# Patient Record
Sex: FEMALE | Race: WHITE | NOT HISPANIC OR LATINO | Employment: FULL TIME | URBAN - NONMETROPOLITAN AREA
[De-identification: names, ages, dates, MRNs, and addresses within clinical notes are randomized per-mention and may not be internally consistent; named-entity substitution may affect disease eponyms.]

---

## 2023-06-08 ENCOUNTER — TELEMEDICINE (OUTPATIENT)
Dept: PRIMARY CARE | Facility: CLINIC | Age: 43
End: 2023-06-08
Payer: COMMERCIAL

## 2023-06-08 DIAGNOSIS — F41.1 GENERALIZED ANXIETY DISORDER: ICD-10-CM

## 2023-06-08 DIAGNOSIS — Z13.0 SCREENING FOR DEFICIENCY ANEMIA: Primary | ICD-10-CM

## 2023-06-08 DIAGNOSIS — G89.29 CHRONIC LEFT HIP PAIN: ICD-10-CM

## 2023-06-08 DIAGNOSIS — Z13.220 SCREENING FOR CHOLESTEROL LEVEL: ICD-10-CM

## 2023-06-08 DIAGNOSIS — M25.552 CHRONIC LEFT HIP PAIN: ICD-10-CM

## 2023-06-08 DIAGNOSIS — R11.0 NAUSEA: ICD-10-CM

## 2023-06-08 PROBLEM — R12 HEARTBURN: Status: ACTIVE | Noted: 2023-06-08

## 2023-06-08 PROBLEM — S76.012A STRAIN OF LEFT HIP: Status: ACTIVE | Noted: 2023-06-08

## 2023-06-08 PROCEDURE — 99214 OFFICE O/P EST MOD 30 MIN: CPT | Performed by: NURSE PRACTITIONER

## 2023-06-08 RX ORDER — BUSPIRONE HYDROCHLORIDE 5 MG/1
5 TABLET ORAL 2 TIMES DAILY
Qty: 60 TABLET | Refills: 11 | Status: SHIPPED | OUTPATIENT
Start: 2023-06-08 | End: 2024-06-07

## 2023-06-08 RX ORDER — MULTIVITAMIN
1 TABLET ORAL DAILY
COMMUNITY

## 2023-06-08 RX ORDER — ASCORBIC ACID 500 MG
1 TABLET ORAL DAILY
COMMUNITY

## 2023-06-08 RX ORDER — HYDROXYZINE HYDROCHLORIDE 10 MG/1
10 TABLET, FILM COATED ORAL EVERY 4 HOURS PRN
COMMUNITY

## 2023-06-08 RX ORDER — CYCLOBENZAPRINE HCL 5 MG
5 TABLET ORAL 3 TIMES DAILY PRN
Qty: 30 TABLET | Refills: 0 | Status: SHIPPED | OUTPATIENT
Start: 2023-06-08

## 2023-06-08 RX ORDER — ONDANSETRON 4 MG/1
4 TABLET, ORALLY DISINTEGRATING ORAL EVERY 8 HOURS PRN
Qty: 20 TABLET | Refills: 0 | Status: SHIPPED | OUTPATIENT
Start: 2023-06-08 | End: 2023-06-15

## 2023-06-08 RX ORDER — CYCLOBENZAPRINE HCL 5 MG
1 TABLET ORAL 3 TIMES DAILY PRN
COMMUNITY
Start: 2022-05-19 | End: 2023-06-08 | Stop reason: SDUPTHER

## 2023-06-08 RX ORDER — GLUC/MSM/COLGN2/HYAL/ANTIARTH3 375-375-20
TABLET ORAL
COMMUNITY

## 2023-06-08 NOTE — PROGRESS NOTES
Subjective   Dary Webster is a 43 y.o. female and is here for a comprehensive physical exam. The patient reports  left hip pain off/on has been doing exercises .  Complains of nausea, likely related to poor appetite due to increased stress/anxiety regarding job  Is taking hydroxyzine daily at bedtime, unable to take during day due to drowsiness.     Do you take any herbs or supplements that were not prescribed by a doctor?  MVI, B12. Iron  Are you taking calcium supplements? no  Are you taking aspirin daily? no     Increase job stress, works in home care      History:  LMP: No LMP recorded.  Menopause at NA years  Last pap date:   Abnormal pap? no  : 2  Para: 2    Do you have pain that bothers you in your daily life? no  Review of Systems   Constitutional:  Positive for appetite change (decreased appetite related to stress/anxiety).   HENT: Negative.     Respiratory:  Negative for cough, shortness of breath and wheezing.    Cardiovascular:  Negative for chest pain, palpitations and leg swelling.   Gastrointestinal:  Positive for nausea. Negative for abdominal pain, diarrhea and vomiting.   Genitourinary: Negative.    Musculoskeletal:  Positive for arthralgias (left hip/low back) and back pain.   Skin: Negative.    Neurological:  Negative for dizziness, weakness, light-headedness and headaches.   Hematological:  Does not bruise/bleed easily.   Psychiatric/Behavioral:  The patient is nervous/anxious.         Reports increased work related stress        Objective   Physical Exam  Constitutional:       General: She is not in acute distress.     Appearance: Normal appearance.      Comments: Unable to perform complete physical exam due to virtual visit, patient was visualized on interactive video.    Pulmonary:      Effort: Pulmonary effort is normal.   Neurological:      Mental Status: She is alert and oriented to person, place, and time.         Assessment/Plan   Healthy female exam.      1. OZIEL: Will  continue on Hydroxyzine as needed and start on Buspar 5 mg twice daily. Will follow up in 3 months  2. Nausea: Prescribed Zofran as needed, may take OTC ginger tablets as needed as well  3. Patient Counseling:  --Nutrition: Stressed importance of moderation in sodium/caffeine intake, saturated fat and cholesterol, caloric balance, sufficient intake of fresh fruits, vegetables, fiber, calcium, iron, and 1 mg of folate supplement per day (for females capable of pregnancy).  --Discussed the issue of estrogen replacement, calcium supplement, and the daily use of baby aspirin.  --Exercise: Stressed the importance of regular exercise.   --Injury prevention: Discussed safety belts, safety helmets, smoke detector, smoking near bedding or upholstery.   --Dental health: Discussed importance of regular tooth brushing, flossing, and dental visits.  --Immunizations reviewed.  --Discussed benefits of screening colonoscopy.  --After hours service discussed with patient  3. Discussed the patient's BMI with her.  The BMI is in the acceptable range.  4. Follow up  wellness in 1 year, follow up on anxiety/nausea in 3 months

## 2023-06-09 ASSESSMENT — ENCOUNTER SYMPTOMS
DIZZINESS: 0
APPETITE CHANGE: 1
COUGH: 0
NERVOUS/ANXIOUS: 1
ARTHRALGIAS: 1
DIARRHEA: 0
NAUSEA: 1
ABDOMINAL PAIN: 0
BRUISES/BLEEDS EASILY: 0
SHORTNESS OF BREATH: 0
BACK PAIN: 1
WHEEZING: 0
LIGHT-HEADEDNESS: 0
WEAKNESS: 0
VOMITING: 0
PALPITATIONS: 0
HEADACHES: 0

## 2023-11-08 ENCOUNTER — LAB (OUTPATIENT)
Dept: LAB | Facility: LAB | Age: 43
End: 2023-11-08
Payer: COMMERCIAL

## 2023-11-08 DIAGNOSIS — F41.1 GENERALIZED ANXIETY DISORDER: ICD-10-CM

## 2023-11-08 DIAGNOSIS — Z13.220 SCREENING FOR CHOLESTEROL LEVEL: ICD-10-CM

## 2023-11-08 DIAGNOSIS — Z13.0 SCREENING FOR DEFICIENCY ANEMIA: ICD-10-CM

## 2023-11-08 LAB
ALBUMIN SERPL BCP-MCNC: 4.6 G/DL (ref 3.4–5)
ALP SERPL-CCNC: 48 U/L (ref 33–110)
ALT SERPL W P-5'-P-CCNC: 18 U/L (ref 7–45)
ANION GAP SERPL CALC-SCNC: 10 MMOL/L (ref 10–20)
AST SERPL W P-5'-P-CCNC: 21 U/L (ref 9–39)
BILIRUB SERPL-MCNC: 0.4 MG/DL (ref 0–1.2)
BUN SERPL-MCNC: 17 MG/DL (ref 6–23)
CALCIUM SERPL-MCNC: 9.2 MG/DL (ref 8.6–10.3)
CHLORIDE SERPL-SCNC: 101 MMOL/L (ref 98–107)
CHOLEST SERPL-MCNC: 185 MG/DL (ref 0–199)
CHOLESTEROL/HDL RATIO: 1.9
CO2 SERPL-SCNC: 28 MMOL/L (ref 21–32)
CREAT SERPL-MCNC: 0.78 MG/DL (ref 0.5–1.05)
ERYTHROCYTE [DISTWIDTH] IN BLOOD BY AUTOMATED COUNT: 12.2 % (ref 11.5–14.5)
GFR SERPL CREATININE-BSD FRML MDRD: >90 ML/MIN/1.73M*2
GLUCOSE SERPL-MCNC: 94 MG/DL (ref 74–99)
HCT VFR BLD AUTO: 40.4 % (ref 36–46)
HDLC SERPL-MCNC: 99 MG/DL
HGB BLD-MCNC: 13 G/DL (ref 12–16)
LDLC SERPL CALC-MCNC: 77 MG/DL
MCH RBC QN AUTO: 30.8 PG (ref 26–34)
MCHC RBC AUTO-ENTMCNC: 32.2 G/DL (ref 32–36)
MCV RBC AUTO: 96 FL (ref 80–100)
NON HDL CHOLESTEROL: 86 MG/DL (ref 0–149)
NRBC BLD-RTO: 0 /100 WBCS (ref 0–0)
PLATELET # BLD AUTO: 310 X10*3/UL (ref 150–450)
POTASSIUM SERPL-SCNC: 4 MMOL/L (ref 3.5–5.3)
PROT SERPL-MCNC: 7.1 G/DL (ref 6.4–8.2)
RBC # BLD AUTO: 4.22 X10*6/UL (ref 4–5.2)
SODIUM SERPL-SCNC: 135 MMOL/L (ref 136–145)
TRIGL SERPL-MCNC: 45 MG/DL (ref 0–149)
VLDL: 9 MG/DL (ref 0–40)
WBC # BLD AUTO: 7.2 X10*3/UL (ref 4.4–11.3)

## 2023-11-08 PROCEDURE — 80061 LIPID PANEL: CPT

## 2023-11-08 PROCEDURE — 36415 COLL VENOUS BLD VENIPUNCTURE: CPT

## 2023-11-08 PROCEDURE — 80053 COMPREHEN METABOLIC PANEL: CPT

## 2023-11-08 PROCEDURE — 85027 COMPLETE CBC AUTOMATED: CPT

## 2024-02-27 ENCOUNTER — OFFICE VISIT (OUTPATIENT)
Dept: OBSTETRICS AND GYNECOLOGY | Facility: CLINIC | Age: 44
End: 2024-02-27
Payer: COMMERCIAL

## 2024-02-27 VITALS
WEIGHT: 163 LBS | HEIGHT: 65 IN | SYSTOLIC BLOOD PRESSURE: 120 MMHG | BODY MASS INDEX: 27.16 KG/M2 | DIASTOLIC BLOOD PRESSURE: 72 MMHG

## 2024-02-27 DIAGNOSIS — N92.0 MENORRHAGIA WITH REGULAR CYCLE: Primary | ICD-10-CM

## 2024-02-27 PROCEDURE — 99203 OFFICE O/P NEW LOW 30 MIN: CPT | Performed by: OBSTETRICS & GYNECOLOGY

## 2024-02-27 PROCEDURE — 1036F TOBACCO NON-USER: CPT | Performed by: OBSTETRICS & GYNECOLOGY

## 2024-02-27 RX ORDER — DROSPIRENONE AND ETHINYL ESTRADIOL 0.03MG-3MG
1 KIT ORAL DAILY
Qty: 28 TABLET | Refills: 3 | Status: ON HOLD | OUTPATIENT
Start: 2024-02-27 | End: 2024-04-25 | Stop reason: ALTCHOICE

## 2024-02-27 NOTE — PROGRESS NOTES
Dary Webster is a 43 y.o. year old female patient.  PCP = Raleigh Allen, APRN-CNP    Chief Complaint   Patient presents with    New Patient Visit     New patient here to discuss endometrial ablation. Patient states her periods have become heavier and more painful. Patient states she has to change a pad/tampon every 2-3 hours. LMP: 24.       HPI   Presents stating that her menstrual flows have progressively become heavier with more cramping over the last several years.  She has tried birth control pills and the Nexplanon in the past without good effect.  Her  had a vasectomy.  She has regular monthly cycles without intermenstrual bleeding.  She is a non-smoker.    OB History          2    Para   2    Term   2       0    AB   0    Living   2         SAB   0    IAB   0    Ectopic   0    Multiple   0    Live Births   2                 Past Medical History:   Diagnosis Date    Encounter for gynecological examination (general) (routine) without abnormal findings     Pap test, as part of routine gynecological examination    Other conditions influencing health status     Menarche    Other conditions influencing health status     History of vaginal delivery       Past Surgical History:   Procedure Laterality Date    OTHER SURGICAL HISTORY  10/26/2019    Hip surgery    OTHER SURGICAL HISTORY  10/26/2019    Corneal lasik    OTHER SURGICAL HISTORY  10/26/2019    Hernia repair    OTHER SURGICAL HISTORY  2021    Cholecystectomy laparoscopic       Review of Systems:   Constitutional: No fever or chills  Respiratory: No shortness of breath, or cough  Cardiovascular: No chest pain or syncope  Breasts: No breast pain, no masses, no nipple discharge  Gastrointestinal: No nausea, vomiting, or diarrhea, no abdominal pain  Genitourinary: No dysuria or frequency  Gynecology: Negative except as noted in history of present illness  All other: All other systems reviewed and negative for  "complaint    Medication Documentation Review Audit       Reviewed by Cholo Echeverria MD (Physician) on 02/27/24 at 0910      Medication Order Taking? Sig Documenting Provider Last Dose Status   ascorbic acid (Vitamin C) 500 mg tablet [id number]  Take 1 tablet (500 mg) by mouth once daily. Historical Provider, MD  Active   busPIRone (Buspar) 5 mg tablet [id number]  Take 1 tablet (5 mg) by mouth 2 times a day. Raleigh Allen APRN-CNP  Active   cyclobenzaprine (Flexeril) 5 mg tablet [id number]  Take 1 tablet (5 mg) by mouth 3 times a day as needed for muscle spasms (hip/low back pain). JOSE Finn-CNP  Active   ferrous gluconate 236 mg (27 mg iron) tablet [id number]  Take by mouth. Historical Provider, MD  Active   hydrOXYzine HCL (Atarax) 10 mg tablet [id number]  Take 1 tablet (10 mg) by mouth every 4 hours if needed. Historical Provider, MD  Active   multivitamin tablet [id number]  Take 1 tablet by mouth once daily. Historical Provider, MD  Active                     /72   Ht 1.651 m (5' 5\")   Wt 73.9 kg (163 lb)   LMP 02/19/2024   BMI 27.12 kg/m²     PHYSICAL EXAMINATION:  General: No acute distress  Eye: Intraocular movements are intact  HEENT: Normocephalic  Respiratory: Respirations are nonlabored  Gastrointestinal: Nondistended   Musculoskeletal: Normal range of motion  Neurologic: Alert and oriented x3  Psychiatric: Cooperative, appropriate mood and affect.         Problem List Items Addressed This Visit    None  Visit Diagnoses       Menorrhagia with regular cycle    -  Primary    Relevant Medications    drospirenone-ethinyl estradioL (Alma, Ocella) 3-0.03 mg tablet             Provider Impression:  1.  Menorrhagia  Discussed hormonal contraceptive options NuvaRing or IUD as well as endometrial ablation for her heavy menstrual flows.  Patient desires to proceed with endometrial ablation.  Recommend starting her on birth control pills in order to help thin out the lining of the uterus in anticipation of " the ablation.  Patient to return in several weeks for annual exam and will also need to schedule appointment for endometrial biopsy in the office.

## 2024-03-26 ENCOUNTER — PREP FOR PROCEDURE (OUTPATIENT)
Dept: OBSTETRICS AND GYNECOLOGY | Facility: CLINIC | Age: 44
End: 2024-03-26

## 2024-03-26 ENCOUNTER — OFFICE VISIT (OUTPATIENT)
Dept: OBSTETRICS AND GYNECOLOGY | Facility: CLINIC | Age: 44
End: 2024-03-26
Payer: COMMERCIAL

## 2024-03-26 VITALS
WEIGHT: 161.8 LBS | SYSTOLIC BLOOD PRESSURE: 128 MMHG | BODY MASS INDEX: 26.96 KG/M2 | HEIGHT: 65 IN | DIASTOLIC BLOOD PRESSURE: 68 MMHG

## 2024-03-26 DIAGNOSIS — Z01.419 ENCOUNTER FOR GYNECOLOGICAL EXAMINATION WITHOUT ABNORMAL FINDING: ICD-10-CM

## 2024-03-26 DIAGNOSIS — Z12.4 ENCOUNTER FOR SCREENING FOR CERVICAL CANCER: ICD-10-CM

## 2024-03-26 DIAGNOSIS — N92.0 MENORRHAGIA WITH REGULAR CYCLE: Primary | ICD-10-CM

## 2024-03-26 DIAGNOSIS — Z12.31 ENCOUNTER FOR SCREENING MAMMOGRAM FOR BREAST CANCER: ICD-10-CM

## 2024-03-26 PROCEDURE — 99396 PREV VISIT EST AGE 40-64: CPT | Performed by: OBSTETRICS & GYNECOLOGY

## 2024-03-26 PROCEDURE — 88175 CYTOPATH C/V AUTO FLUID REDO: CPT

## 2024-03-26 PROCEDURE — 1036F TOBACCO NON-USER: CPT | Performed by: OBSTETRICS & GYNECOLOGY

## 2024-03-26 RX ORDER — CELECOXIB 400 MG/1
400 CAPSULE ORAL ONCE
Status: CANCELLED | OUTPATIENT
Start: 2024-03-26 | End: 2024-03-26

## 2024-03-26 RX ORDER — ACETAMINOPHEN 325 MG/1
975 TABLET ORAL ONCE
Status: CANCELLED | OUTPATIENT
Start: 2024-03-26 | End: 2024-03-26

## 2024-03-26 RX ORDER — GABAPENTIN 600 MG/1
600 TABLET ORAL ONCE
Status: CANCELLED | OUTPATIENT
Start: 2024-03-26 | End: 2024-03-26

## 2024-03-26 NOTE — PROGRESS NOTES
Dary Webster is a 44 y.o. female who is here for a routine exam. PCP = Raleigh Allen, APRN-CNP    Chief Complaint   Patient presents with    Gynecologic Exam     Patient is here for yearly exam and pap test. Patient does do regular self breast exams and has no concerns at this time. LMP: 3/25/24.          Presents for annual exam. She voices no complaints and is doing well. Denies any bowel or bladder problems. Denies any breast problems.   had vasectomy.  Patient started Alma last month in anticipation of endometrial ablation.    OB History          2    Para   2    Term   2       0    AB   0    Living   2         SAB   0    IAB   0    Ectopic   0    Multiple   0    Live Births   2                  Social History     Substance and Sexual Activity   Sexual Activity Yes     Current contraception: Vasectomy    Past Medical History:   Diagnosis Date    Encounter for gynecological examination (general) (routine) without abnormal findings     Pap test, as part of routine gynecological examination    Other conditions influencing health status     Menarche    Other conditions influencing health status     History of vaginal delivery       Past Surgical History:   Procedure Laterality Date    OTHER SURGICAL HISTORY  10/26/2019    Hip surgery    OTHER SURGICAL HISTORY  10/26/2019    Corneal lasik    OTHER SURGICAL HISTORY  10/26/2019    Hernia repair    OTHER SURGICAL HISTORY  2021    Cholecystectomy laparoscopic       Past med hx and past surg hx reviewed and notable for: none    Review of Systems:   Constitutional: No fever or chills  Respiratory: No shortness of breath, or cough  Cardiovascular: No chest pain or syncope  Breasts: No breast pain, no masses, no nipple discharge  Gastrointestinal: No nausea, vomiting, or diarrhea, no abdominal pain  Genitourinary: No dysuria or frequency  Gynecology: Negative except as noted in history of present illness  All other: All other systems reviewed  "and negative for complaint    Objective   /68   Ht 1.651 m (5' 5\")   Wt 73.4 kg (161 lb 12.8 oz)   LMP 03/25/2024   BMI 26.92 kg/m²     PHYSICAL EXAMINATION:  Well-developed, well nourished, in no acute distress, alert and oriented x three, is pleasant and cooperative.   HEENT: Clear. Pupils equal, round and reactive to light and accommodation. Extraocular muscles are intact. Oral mucosa pink without exudate.   NECK: No lymphadenopathy, no thyromegaly.  BREASTS: Symmetric, no palpable masses. No nipple discharge or retraction.  LUNGS: Clear bilaterally.  HEART: Regular rate and rhythm without murmurs.  ABDOMEN: Normoactive bowel sounds, soft and nontender, no guarding or rebound tenderness, no CVA tenderness.  EXTREMITIES: No clubbing, cyanosis or edema.  NEUROLOGIC:  Cranial nerves II-XII grossly intact.  :  Normal external female genitalia, normal vulva, normal vagina. Normal urethral meatus, urethra and bladder. Normal appearing cervix. Normal-sized uterus, no adnexal masses or tenderness. Pap smear performed today.      Actions performed during this visit include:  - Clinical breast exam  - Clinical pelvic exam  -   Orders Placed This Encounter   Procedures    BI mammo bilateral screening tomosynthesis     Standing Status:   Future     Standing Expiration Date:   5/26/2025     Order Specific Question:   Reason for exam:     Answer:   Screening     Order Specific Question:   Radiologist to Determine Optimal Study     Answer:   Yes     Order Specific Question:   Release result to [employer]     Answer:   Immediate [1]     Order Specific Question:   Is this exam part of a Research Study? If Yes, link this order to the research study     Answer:   No     Order Specific Question:   Is the patient pregnant?     Answer:   No        Problem List Items Addressed This Visit    None  Visit Diagnoses       Encounter for gynecological examination without abnormal finding        Relevant Orders    THINPREP PAP TEST "    Encounter for screening for cervical cancer        Relevant Orders    THINPREP PAP TEST    Encounter for screening mammogram for breast cancer        Relevant Orders    BI mammo bilateral screening tomosynthesis             Provider Impression:  1.  Annual  2.  Screening mammogram  3.  Menorrhagia    Thank you for coming to your annual exam. Your findings during the exam were normal.  Please return for your next visit in 2 weeks for endometrial biopsy and scheduling surgery for endometrial ablation..

## 2024-04-03 LAB
CYTOLOGY CMNT CVX/VAG CYTO-IMP: NORMAL
LAB AP HPV GENOTYPE QUESTION: YES
LAB AP HPV HR: NORMAL
LABORATORY COMMENT REPORT: NORMAL
LMP START DATE: NORMAL
PATH REPORT.TOTAL CANCER: NORMAL

## 2024-04-12 ENCOUNTER — PREP FOR PROCEDURE (OUTPATIENT)
Dept: OBSTETRICS AND GYNECOLOGY | Facility: CLINIC | Age: 44
End: 2024-04-12

## 2024-04-12 ENCOUNTER — PROCEDURE VISIT (OUTPATIENT)
Dept: OBSTETRICS AND GYNECOLOGY | Facility: CLINIC | Age: 44
End: 2024-04-12
Payer: COMMERCIAL

## 2024-04-12 VITALS
SYSTOLIC BLOOD PRESSURE: 120 MMHG | DIASTOLIC BLOOD PRESSURE: 66 MMHG | BODY MASS INDEX: 26.66 KG/M2 | HEIGHT: 65 IN | WEIGHT: 160 LBS

## 2024-04-12 DIAGNOSIS — N92.0 MENORRHAGIA WITH REGULAR CYCLE: ICD-10-CM

## 2024-04-12 DIAGNOSIS — Z32.02 PREGNANCY EXAMINATION OR TEST, NEGATIVE RESULT: ICD-10-CM

## 2024-04-12 DIAGNOSIS — N92.0 MENORRHAGIA WITH REGULAR CYCLE: Primary | ICD-10-CM

## 2024-04-12 LAB — PREGNANCY TEST URINE, POC: NEGATIVE

## 2024-04-12 PROCEDURE — 58100 BIOPSY OF UTERUS LINING: CPT | Performed by: OBSTETRICS & GYNECOLOGY

## 2024-04-12 PROCEDURE — 81025 URINE PREGNANCY TEST: CPT | Performed by: OBSTETRICS & GYNECOLOGY

## 2024-04-12 PROCEDURE — 88305 TISSUE EXAM BY PATHOLOGIST: CPT | Performed by: STUDENT IN AN ORGANIZED HEALTH CARE EDUCATION/TRAINING PROGRAM

## 2024-04-12 PROCEDURE — 99213 OFFICE O/P EST LOW 20 MIN: CPT | Performed by: OBSTETRICS & GYNECOLOGY

## 2024-04-12 PROCEDURE — 88305 TISSUE EXAM BY PATHOLOGIST: CPT

## 2024-04-12 NOTE — PROGRESS NOTES
Dary Webster is a 44 y.o. year old female patient.  PCP = Raleigh Allen, APRN-CNP    Chief Complaint   Patient presents with    pre operative clearance     EMB       HPI   Presents for endometrial biopsy in anticipation of endometrial ablation.  Patient was started on birth control pills in late February in anticipation of endometrial ablation.  Patient states that her menstrual flows have become heavier with more cramping over the last several years.  She has tried in the past hormonal contraceptives with no relief of her symptoms.  She has regular monthly cycles without intermenstrual bleeding.  Her  had a vasectomy.    OB History          2    Para   2    Term   2       0    AB   0    Living   2         SAB   0    IAB   0    Ectopic   0    Multiple   0    Live Births   2                 Past Medical History:   Diagnosis Date    Encounter for gynecological examination (general) (routine) without abnormal findings     Pap test, as part of routine gynecological examination    Other conditions influencing health status     Menarche    Other conditions influencing health status     History of vaginal delivery       Past Surgical History:   Procedure Laterality Date    OTHER SURGICAL HISTORY  10/26/2019    Hip surgery    OTHER SURGICAL HISTORY  10/26/2019    Corneal lasik    OTHER SURGICAL HISTORY  10/26/2019    Hernia repair    OTHER SURGICAL HISTORY  2021    Cholecystectomy laparoscopic       Review of Systems:   Constitutional: No fever or chills  Respiratory: No shortness of breath, or cough  Cardiovascular: No chest pain or syncope  Breasts: No breast pain, no masses, no nipple discharge  Gastrointestinal: No nausea, vomiting, or diarrhea, no abdominal pain  Genitourinary: No dysuria or frequency  Gynecology: Negative except as noted in history of present illness  All other: All other systems reviewed and negative for complaint    Medication Documentation Review Audit       Reviewed  "by Cholo Echeverria MD (Physician) on 04/12/24 at 1041      Medication Order Taking? Sig Documenting Provider Last Dose Status   ascorbic acid (Vitamin C) 500 mg tablet [id number]  Take 1 tablet (500 mg) by mouth once daily. Historical Provider, MD  Active   busPIRone (Buspar) 5 mg tablet [id number]  Take 1 tablet (5 mg) by mouth 2 times a day. Raleigh Allen APRN-CNP  Active   cyclobenzaprine (Flexeril) 5 mg tablet [id number]  Take 1 tablet (5 mg) by mouth 3 times a day as needed for muscle spasms (hip/low back pain). Raleigh Allen APRN-CNP  Active   drospirenone-ethinyl estradioL (Alma, Ocella) 3-0.03 mg tablet [id number]  Take 1 tablet by mouth once daily. Cholo Echeverria MD  Active   etonogestrel-eluting contraceptive 68 mg implant implant [id number]  Inject under the skin. Historical Provider, MD  Active   ferrous gluconate 236 mg (27 mg iron) tablet [id number]  Take by mouth. Historical Provider, MD  Active   hydrOXYzine HCL (Atarax) 10 mg tablet [id number]  Take 1 tablet (10 mg) by mouth every 4 hours if needed. Historical Provider, MD  Active   multivitamin tablet [id number]  Take 1 tablet by mouth once daily. Historical Provider, MD  Active                     /66   Ht 1.651 m (5' 5\")   Wt 72.6 kg (160 lb)   LMP 03/25/2024   BMI 26.63 kg/m²     PHYSICAL EXAMINATION:  Well-developed, well nourished, in no acute distress, alert and oriented x three, is pleasant and cooperative.  HEENT: Clear. Pupils equal, round and reactive to light and accommodation. Extraocular muscles are intact. Oral mucosa pink without exudate.   NECK: No lymphadenopathy, no thyromegaly.  LUNGS: Clear bilaterally.  HEART: Regular rate and rhythm without murmurs.  ABDOMEN: Normoactive bowel sounds, soft and nontender, no guarding or rebound tenderness, no CVA tenderness.  EXTREMITIES: No clubbing, cyanosis or edema.  NEUROLOGIC:  Cranial nerves II-XII grossly intact.  :  Normal external female genitalia, normal vulva, normal vagina. Normal " urethral meatus, urethra and bladder. Normal appearing cervix. Normal-sized uterus, no adnexal masses or tenderness.  Cervix was cleansed with Betadine solution.  Uterus was sounded to 8 cm.  Endometrial biopsy obtained with Pipelle.  Patient tolerated procedure well.  Plan for surgery on April 25.        Orders Placed This Encounter   Procedures    POCT pregnancy, urine manually resulted     Order Specific Question:   Release result to [facility]     Answer:   Immediate [1]        Problem List Items Addressed This Visit       Menorrhagia with regular cycle    Relevant Orders    Surgical Pathology Exam     Other Visit Diagnoses       Pregnancy examination or test, negative result        Relevant Orders    POCT pregnancy, urine manually resulted (Completed)             Provider Impression:  1.  Menorrhagia  2.  Endometrial biopsy performed today  Patient to be scheduled for hysteroscopy, dilatation curettage and endometrial ablation with NovaSure.  She was informed that 50 to 75% of individuals will have no flow or lighter flow following the procedure.  The other 25% of individuals can still have heavy menstrual flows.  Patient informed of risks of surgery including risk of anesthesia, infection, bleeding, injury to internal organs including bowel, bladder or ureter, uterine perforation or fistula formation. Her questions were answered to her satisfaction and she agrees to undergo the procedure.

## 2024-04-12 NOTE — H&P (VIEW-ONLY)
Dary Webster is a 44 y.o. year old female patient.  PCP = Raleigh Allen, APRN-CNP    Chief Complaint   Patient presents with    pre operative clearance     EMB       HPI   Presents for endometrial biopsy in anticipation of endometrial ablation.  Patient was started on birth control pills in late February in anticipation of endometrial ablation.  Patient states that her menstrual flows have become heavier with more cramping over the last several years.  She has tried in the past hormonal contraceptives with no relief of her symptoms.  She has regular monthly cycles without intermenstrual bleeding.  Her  had a vasectomy.    OB History          2    Para   2    Term   2       0    AB   0    Living   2         SAB   0    IAB   0    Ectopic   0    Multiple   0    Live Births   2                 Past Medical History:   Diagnosis Date    Encounter for gynecological examination (general) (routine) without abnormal findings     Pap test, as part of routine gynecological examination    Other conditions influencing health status     Menarche    Other conditions influencing health status     History of vaginal delivery       Past Surgical History:   Procedure Laterality Date    OTHER SURGICAL HISTORY  10/26/2019    Hip surgery    OTHER SURGICAL HISTORY  10/26/2019    Corneal lasik    OTHER SURGICAL HISTORY  10/26/2019    Hernia repair    OTHER SURGICAL HISTORY  2021    Cholecystectomy laparoscopic       Review of Systems:   Constitutional: No fever or chills  Respiratory: No shortness of breath, or cough  Cardiovascular: No chest pain or syncope  Breasts: No breast pain, no masses, no nipple discharge  Gastrointestinal: No nausea, vomiting, or diarrhea, no abdominal pain  Genitourinary: No dysuria or frequency  Gynecology: Negative except as noted in history of present illness  All other: All other systems reviewed and negative for complaint    Medication Documentation Review Audit       Reviewed  "by Cholo Echeverria MD (Physician) on 04/12/24 at 1041      Medication Order Taking? Sig Documenting Provider Last Dose Status   ascorbic acid (Vitamin C) 500 mg tablet [id number]  Take 1 tablet (500 mg) by mouth once daily. Historical Provider, MD  Active   busPIRone (Buspar) 5 mg tablet [id number]  Take 1 tablet (5 mg) by mouth 2 times a day. Raleigh Allen APRN-CNP  Active   cyclobenzaprine (Flexeril) 5 mg tablet [id number]  Take 1 tablet (5 mg) by mouth 3 times a day as needed for muscle spasms (hip/low back pain). Raleigh Allen APRN-CNP  Active   drospirenone-ethinyl estradioL (Alma, Ocella) 3-0.03 mg tablet [id number]  Take 1 tablet by mouth once daily. Cholo Echeverria MD  Active   etonogestrel-eluting contraceptive 68 mg implant implant [id number]  Inject under the skin. Historical Provider, MD  Active   ferrous gluconate 236 mg (27 mg iron) tablet [id number]  Take by mouth. Historical Provider, MD  Active   hydrOXYzine HCL (Atarax) 10 mg tablet [id number]  Take 1 tablet (10 mg) by mouth every 4 hours if needed. Historical Provider, MD  Active   multivitamin tablet [id number]  Take 1 tablet by mouth once daily. Historical Provider, MD  Active                     /66   Ht 1.651 m (5' 5\")   Wt 72.6 kg (160 lb)   LMP 03/25/2024   BMI 26.63 kg/m²     PHYSICAL EXAMINATION:  Well-developed, well nourished, in no acute distress, alert and oriented x three, is pleasant and cooperative.  HEENT: Clear. Pupils equal, round and reactive to light and accommodation. Extraocular muscles are intact. Oral mucosa pink without exudate.   NECK: No lymphadenopathy, no thyromegaly.  LUNGS: Clear bilaterally.  HEART: Regular rate and rhythm without murmurs.  ABDOMEN: Normoactive bowel sounds, soft and nontender, no guarding or rebound tenderness, no CVA tenderness.  EXTREMITIES: No clubbing, cyanosis or edema.  NEUROLOGIC:  Cranial nerves II-XII grossly intact.  :  Normal external female genitalia, normal vulva, normal vagina. Normal " urethral meatus, urethra and bladder. Normal appearing cervix. Normal-sized uterus, no adnexal masses or tenderness.  Cervix was cleansed with Betadine solution.  Uterus was sounded to 8 cm.  Endometrial biopsy obtained with Pipelle.  Patient tolerated procedure well.  Plan for surgery on April 25.        Orders Placed This Encounter   Procedures    POCT pregnancy, urine manually resulted     Order Specific Question:   Release result to [facility]     Answer:   Immediate [1]        Problem List Items Addressed This Visit       Menorrhagia with regular cycle    Relevant Orders    Surgical Pathology Exam     Other Visit Diagnoses       Pregnancy examination or test, negative result        Relevant Orders    POCT pregnancy, urine manually resulted (Completed)             Provider Impression:  1.  Menorrhagia  2.  Endometrial biopsy performed today  Patient to be scheduled for hysteroscopy, dilatation curettage and endometrial ablation with NovaSure.  She was informed that 50 to 75% of individuals will have no flow or lighter flow following the procedure.  The other 25% of individuals can still have heavy menstrual flows.  Patient informed of risks of surgery including risk of anesthesia, infection, bleeding, injury to internal organs including bowel, bladder or ureter, uterine perforation or fistula formation. Her questions were answered to her satisfaction and she agrees to undergo the procedure.

## 2024-04-18 LAB
LABORATORY COMMENT REPORT: NORMAL
PATH REPORT.FINAL DX SPEC: NORMAL
PATH REPORT.GROSS SPEC: NORMAL
PATH REPORT.TOTAL CANCER: NORMAL

## 2024-04-18 NOTE — PREPROCEDURE INSTRUCTIONS
No outpatient medications have been marked as taking for the 4/25/24 encounter (Hospital Encounter).                       NPO Instructions:    Nothing to eat or drink after midnight    Additional Instructions:     Will need  home, will receive call day before surgery with arrival time

## 2024-04-25 ENCOUNTER — HOSPITAL ENCOUNTER (OUTPATIENT)
Facility: HOSPITAL | Age: 44
Setting detail: OUTPATIENT SURGERY
Discharge: HOME | End: 2024-04-25
Attending: OBSTETRICS & GYNECOLOGY | Admitting: OBSTETRICS & GYNECOLOGY
Payer: COMMERCIAL

## 2024-04-25 ENCOUNTER — ANESTHESIA (OUTPATIENT)
Dept: OPERATING ROOM | Facility: HOSPITAL | Age: 44
End: 2024-04-25
Payer: COMMERCIAL

## 2024-04-25 ENCOUNTER — ANESTHESIA EVENT (OUTPATIENT)
Dept: OPERATING ROOM | Facility: HOSPITAL | Age: 44
End: 2024-04-25
Payer: COMMERCIAL

## 2024-04-25 VITALS
BODY MASS INDEX: 26.63 KG/M2 | SYSTOLIC BLOOD PRESSURE: 124 MMHG | TEMPERATURE: 96.8 F | HEIGHT: 65 IN | WEIGHT: 159.83 LBS | RESPIRATION RATE: 18 BRPM | DIASTOLIC BLOOD PRESSURE: 89 MMHG | HEART RATE: 76 BPM | OXYGEN SATURATION: 98 %

## 2024-04-25 DIAGNOSIS — N92.0 MENORRHAGIA WITH REGULAR CYCLE: Primary | ICD-10-CM

## 2024-04-25 LAB — HCG UR QL IA.RAPID: NEGATIVE

## 2024-04-25 PROCEDURE — 81025 URINE PREGNANCY TEST: CPT | Performed by: OBSTETRICS & GYNECOLOGY

## 2024-04-25 PROCEDURE — 2500000005 HC RX 250 GENERAL PHARMACY W/O HCPCS: Performed by: ANESTHESIOLOGY

## 2024-04-25 PROCEDURE — 2500000004 HC RX 250 GENERAL PHARMACY W/ HCPCS (ALT 636 FOR OP/ED): Performed by: ANESTHESIOLOGY

## 2024-04-25 PROCEDURE — 7100000002 HC RECOVERY ROOM TIME - EACH INCREMENTAL 1 MINUTE: Performed by: OBSTETRICS & GYNECOLOGY

## 2024-04-25 PROCEDURE — 3600000003 HC OR TIME - INITIAL BASE CHARGE - PROCEDURE LEVEL THREE: Performed by: OBSTETRICS & GYNECOLOGY

## 2024-04-25 PROCEDURE — 2720000007 HC OR 272 NO HCPCS: Performed by: OBSTETRICS & GYNECOLOGY

## 2024-04-25 PROCEDURE — 3700000002 HC GENERAL ANESTHESIA TIME - EACH INCREMENTAL 1 MINUTE: Performed by: OBSTETRICS & GYNECOLOGY

## 2024-04-25 PROCEDURE — 2500000001 HC RX 250 WO HCPCS SELF ADMINISTERED DRUGS (ALT 637 FOR MEDICARE OP): Performed by: OBSTETRICS & GYNECOLOGY

## 2024-04-25 PROCEDURE — 3600000008 HC OR TIME - EACH INCREMENTAL 1 MINUTE - PROCEDURE LEVEL THREE: Performed by: OBSTETRICS & GYNECOLOGY

## 2024-04-25 PROCEDURE — 7100000009 HC PHASE TWO TIME - INITIAL BASE CHARGE: Performed by: OBSTETRICS & GYNECOLOGY

## 2024-04-25 PROCEDURE — 7100000001 HC RECOVERY ROOM TIME - INITIAL BASE CHARGE: Performed by: OBSTETRICS & GYNECOLOGY

## 2024-04-25 PROCEDURE — 58563 HYSTEROSCOPY ABLATION: CPT | Performed by: OBSTETRICS & GYNECOLOGY

## 2024-04-25 PROCEDURE — 7100000010 HC PHASE TWO TIME - EACH INCREMENTAL 1 MINUTE: Performed by: OBSTETRICS & GYNECOLOGY

## 2024-04-25 PROCEDURE — 3700000001 HC GENERAL ANESTHESIA TIME - INITIAL BASE CHARGE: Performed by: OBSTETRICS & GYNECOLOGY

## 2024-04-25 RX ORDER — MORPHINE SULFATE 4 MG/ML
4 INJECTION, SOLUTION INTRAMUSCULAR; INTRAVENOUS EVERY 5 MIN PRN
Status: DISCONTINUED | OUTPATIENT
Start: 2024-04-25 | End: 2024-04-25 | Stop reason: HOSPADM

## 2024-04-25 RX ORDER — PROMETHAZINE HYDROCHLORIDE 25 MG/ML
INJECTION, SOLUTION INTRAMUSCULAR; INTRAVENOUS AS NEEDED
Status: DISCONTINUED | OUTPATIENT
Start: 2024-04-25 | End: 2024-04-25

## 2024-04-25 RX ORDER — ACETAMINOPHEN 325 MG/1
975 TABLET ORAL ONCE
Status: COMPLETED | OUTPATIENT
Start: 2024-04-25 | End: 2024-04-25

## 2024-04-25 RX ORDER — MORPHINE SULFATE 4 MG/ML
2 INJECTION, SOLUTION INTRAMUSCULAR; INTRAVENOUS EVERY 5 MIN PRN
Status: DISCONTINUED | OUTPATIENT
Start: 2024-04-25 | End: 2024-04-25 | Stop reason: HOSPADM

## 2024-04-25 RX ORDER — DEXAMETHASONE SODIUM PHOSPHATE 10 MG/ML
8 INJECTION INTRAMUSCULAR; INTRAVENOUS ONCE
Status: COMPLETED | OUTPATIENT
Start: 2024-04-25 | End: 2024-04-25

## 2024-04-25 RX ORDER — PROPOFOL 10 MG/ML
INJECTION, EMULSION INTRAVENOUS AS NEEDED
Status: DISCONTINUED | OUTPATIENT
Start: 2024-04-25 | End: 2024-04-25

## 2024-04-25 RX ORDER — ONDANSETRON HYDROCHLORIDE 2 MG/ML
4 INJECTION, SOLUTION INTRAVENOUS ONCE
Status: COMPLETED | OUTPATIENT
Start: 2024-04-25 | End: 2024-04-25

## 2024-04-25 RX ORDER — FENTANYL CITRATE 50 UG/ML
INJECTION, SOLUTION INTRAMUSCULAR; INTRAVENOUS AS NEEDED
Status: DISCONTINUED | OUTPATIENT
Start: 2024-04-25 | End: 2024-04-25

## 2024-04-25 RX ORDER — GABAPENTIN 300 MG/1
600 CAPSULE ORAL ONCE
Status: COMPLETED | OUTPATIENT
Start: 2024-04-25 | End: 2024-04-25

## 2024-04-25 RX ORDER — ACETAMINOPHEN 325 MG/1
975 TABLET ORAL ONCE
Status: DISCONTINUED | OUTPATIENT
Start: 2024-04-25 | End: 2024-04-25

## 2024-04-25 RX ORDER — ACETAMINOPHEN AND CODEINE PHOSPHATE 300; 30 MG/1; MG/1
1 TABLET ORAL EVERY 6 HOURS PRN
Qty: 10 TABLET | Refills: 0 | Status: SHIPPED | OUTPATIENT
Start: 2024-04-25

## 2024-04-25 RX ORDER — ONDANSETRON HYDROCHLORIDE 2 MG/ML
4 INJECTION, SOLUTION INTRAVENOUS ONCE AS NEEDED
Status: DISCONTINUED | OUTPATIENT
Start: 2024-04-25 | End: 2024-04-25 | Stop reason: HOSPADM

## 2024-04-25 RX ORDER — OXYCODONE HYDROCHLORIDE 5 MG/1
5 TABLET ORAL EVERY 4 HOURS PRN
Status: DISCONTINUED | OUTPATIENT
Start: 2024-04-25 | End: 2024-04-25 | Stop reason: HOSPADM

## 2024-04-25 RX ORDER — SODIUM CHLORIDE, SODIUM LACTATE, POTASSIUM CHLORIDE, CALCIUM CHLORIDE 600; 310; 30; 20 MG/100ML; MG/100ML; MG/100ML; MG/100ML
100 INJECTION, SOLUTION INTRAVENOUS CONTINUOUS
Status: DISCONTINUED | OUTPATIENT
Start: 2024-04-25 | End: 2024-04-25 | Stop reason: HOSPADM

## 2024-04-25 RX ORDER — LABETALOL HYDROCHLORIDE 5 MG/ML
5 INJECTION, SOLUTION INTRAVENOUS ONCE AS NEEDED
Status: DISCONTINUED | OUTPATIENT
Start: 2024-04-25 | End: 2024-04-25 | Stop reason: HOSPADM

## 2024-04-25 RX ORDER — SODIUM CHLORIDE, SODIUM LACTATE, POTASSIUM CHLORIDE, CALCIUM CHLORIDE 600; 310; 30; 20 MG/100ML; MG/100ML; MG/100ML; MG/100ML
50 INJECTION, SOLUTION INTRAVENOUS CONTINUOUS
Status: DISCONTINUED | OUTPATIENT
Start: 2024-04-25 | End: 2024-04-25 | Stop reason: HOSPADM

## 2024-04-25 RX ORDER — MIDAZOLAM HYDROCHLORIDE 1 MG/ML
1 INJECTION INTRAMUSCULAR; INTRAVENOUS ONCE
Status: COMPLETED | OUTPATIENT
Start: 2024-04-25 | End: 2024-04-25

## 2024-04-25 RX ORDER — CELECOXIB 200 MG/1
400 CAPSULE ORAL ONCE
Status: COMPLETED | OUTPATIENT
Start: 2024-04-25 | End: 2024-04-25

## 2024-04-25 RX ADMIN — Medication 20 MG: at 07:26

## 2024-04-25 RX ADMIN — CELECOXIB 400 MG: 200 CAPSULE ORAL at 06:40

## 2024-04-25 RX ADMIN — SODIUM CHLORIDE, POTASSIUM CHLORIDE, SODIUM LACTATE AND CALCIUM CHLORIDE 50 ML/HR: 600; 310; 30; 20 INJECTION, SOLUTION INTRAVENOUS at 06:44

## 2024-04-25 RX ADMIN — ONDANSETRON 4 MG: 2 INJECTION INTRAMUSCULAR; INTRAVENOUS at 06:48

## 2024-04-25 RX ADMIN — FENTANYL CITRATE 100 MCG: 50 INJECTION, SOLUTION INTRAMUSCULAR; INTRAVENOUS at 07:26

## 2024-04-25 RX ADMIN — GABAPENTIN 600 MG: 300 CAPSULE ORAL at 06:40

## 2024-04-25 RX ADMIN — DEXAMETHASONE SODIUM PHOSPHATE 8 MG: 10 INJECTION INTRAMUSCULAR; INTRAVENOUS at 06:50

## 2024-04-25 RX ADMIN — PROPOFOL 100 MG: 10 INJECTION, EMULSION INTRAVENOUS at 07:26

## 2024-04-25 RX ADMIN — ACETAMINOPHEN 975 MG: 325 TABLET ORAL at 06:40

## 2024-04-25 RX ADMIN — PROMETHAZINE HYDROCHLORIDE 6.25 MG: 25 INJECTION INTRAMUSCULAR; INTRAVENOUS at 07:29

## 2024-04-25 RX ADMIN — MIDAZOLAM HYDROCHLORIDE 1 MG: 1 INJECTION, SOLUTION INTRAMUSCULAR; INTRAVENOUS at 07:02

## 2024-04-25 ASSESSMENT — COLUMBIA-SUICIDE SEVERITY RATING SCALE - C-SSRS
2. HAVE YOU ACTUALLY HAD ANY THOUGHTS OF KILLING YOURSELF?: NO
1. IN THE PAST MONTH, HAVE YOU WISHED YOU WERE DEAD OR WISHED YOU COULD GO TO SLEEP AND NOT WAKE UP?: NO
6. HAVE YOU EVER DONE ANYTHING, STARTED TO DO ANYTHING, OR PREPARED TO DO ANYTHING TO END YOUR LIFE?: NO

## 2024-04-25 ASSESSMENT — PAIN - FUNCTIONAL ASSESSMENT
PAIN_FUNCTIONAL_ASSESSMENT: 0-10

## 2024-04-25 ASSESSMENT — PAIN DESCRIPTION - DESCRIPTORS
DESCRIPTORS: CRAMPING
DESCRIPTORS: ACHING;CRAMPING
DESCRIPTORS: ACHING;CRAMPING

## 2024-04-25 ASSESSMENT — PAIN SCALES - GENERAL
PAINLEVEL_OUTOF10: 0 - NO PAIN
PAINLEVEL_OUTOF10: 2
PAINLEVEL_OUTOF10: 0 - NO PAIN
PAINLEVEL_OUTOF10: 1
PAINLEVEL_OUTOF10: 0 - NO PAIN
PAINLEVEL_OUTOF10: 2
PAINLEVEL_OUTOF10: 0 - NO PAIN
PAINLEVEL_OUTOF10: 0 - NO PAIN

## 2024-04-25 NOTE — ANESTHESIA POSTPROCEDURE EVALUATION
Patient: Dary Webster    Procedure Summary       Date: 04/25/24 Room / Location: [facility] OR 03 / [facility]    Anesthesia Start: 0722 Anesthesia Stop: 0758    Procedure: Hysteroscopy, endometrial ablation with NovaSure (Uterus) Diagnosis:       Menorrhagia with regular cycle      (Menorrhagia with regular cycle [N92.0])    Surgeons: Cholo Echeverria MD Responsible Provider: Yemi Pacheco MD    Anesthesia Type: general ASA Status: 2            Anesthesia Type: general    Vitals Value Taken Time   /89 04/25/24 0835   Temp 36 °C (96.8 °F) 04/25/24 0800   Pulse 76 04/25/24 0835   Resp 18 04/25/24 0835   SpO2 98 % 04/25/24 0835       Anesthesia Post Evaluation    Patient location during evaluation: bedside  Patient participation: complete - patient participated  Level of consciousness: sleepy but conscious  Pain management: adequate  Airway patency: patent  Cardiovascular status: acceptable  Respiratory status: acceptable  Hydration status: acceptable  Postoperative Nausea and Vomiting: none    No notable events documented.

## 2024-04-25 NOTE — OP NOTE
Hysteroscopy, endometrial ablation with NovaSure Operative Note     Date: 2024  OR Location: [facility] OR    Name: Dary Webster, : 1980, Age: 44 y.o., MRN: [id number], Sex: female    Diagnosis  Pre-op Diagnosis     * Menorrhagia with regular cycle [N92.0] Post-op Diagnosis     * Menorrhagia with regular cycle [N92.0]     Procedures  Hysteroscopy, endometrial ablation with NovaSure  66418 - ME HYSTEROSCOPY ENDOMETRIAL ABLATION    ME HYSTEROSCOPY ENDOMETRIAL ABLATION [30103]  Surgeons      * Cholo Echeverria - Primary    Resident/Fellow/Other Assistant:  Surgeons and Role:  * No surgeons found with a matching role *    Procedure Summary  Anesthesia: General  ASA: II  Anesthesia Staff: Anesthesiologist: Yemi Pacheco MD  Estimated Blood Loss: 2mL  Intra-op Medications: Administrations occurring from 0730 to 0830 on 24:  * No intraprocedure medications in log *           Anesthesia Record               Intraprocedure I/O Totals       None           Specimen: No specimens collected     Staff:   Circulator: Radha Garcia RN  Scrub Person: Dary Nava         Drains and/or Catheters: * None in log *    Tourniquet Times:         Implants:     Findings: Size anteverted uterus.  Both tubal ostia easily identified.  No evidence of submucous fibroids or endometrial polyps.    Indications: Dary Webster is an 44 y.o. female who is having surgery for Menorrhagia with regular cycle [N92.0].     The patient was seen in the preoperative area. The risks, benefits, complications, treatment options, non-operative alternatives, expected recovery and outcomes were discussed with the patient. The possibilities of reaction to medication, pulmonary aspiration, injury to surrounding structures, bleeding, recurrent infection, the need for additional procedures, failure to diagnose a condition, and creating a complication requiring transfusion or operation were discussed with the patient. The patient concurred with the  proposed plan, giving informed consent.  The site of surgery was properly noted/marked if necessary per policy. The patient has been actively warmed in preoperative area. Preoperative antibiotics are not indicated. Venous thrombosis prophylaxis have been ordered including bilateral sequential compression devices    Procedure Details:   Operative Findings: Normal size anteverted uterus.  Both tubal ostia easily identified.  No evidence of submucous fibroids or endometrial polyps.    Procedure:  The patient was brought in to the operating room, and placed in the supine position. Sequential compression devices were placed on the lower extremities. After an adequate level of general anesthesia, she was then placed in the low lithotomy position.  The perineum and vagina were prepped and draped in the usual sterile fashion.  Bimanual exam was performed, and above findings noted.  The urinary bladder was drained using a straight red Woodard catheter.  A weighted speculum was placed in the vagina.  The anterior vaginal wall was elevated.   The anterior lip of the cervix was grasped with an Allis clamp. The uterus was sounded to 9 cm.  The cervix was gradually dilated using Marty dilators.    The hysteroscope was then introduced through the cervix into the uterine cavity.  The endocervical and endometrial cavity were inspected and above findings noted. The Nova Sure was then introduced into the uterus and deployed. Uterine integrity test was performed and passed. The cavity length was set at 5 cm and cavity width was 4.5 cm. Power setting was 124. The endometrial ablation was performed for 66 second treatment cycle. The Nova Sure was removed and the hysteroscope was introduced into the uterus showing excellent blanching of the endometrial lining. At this point the procedure was ended.        The hysteroscope, Allis clamp and weighted speculum were all removed. Sponge and needle counts were correct x 2.  The patient tolerated  the procedure well and was taken to PACU in good condition. Patient to receive Tylenol No. 3, one tablet po every 4 hours PRN (10).  Patient to follow-up in office in 2 weeks.    Complications:  None; patient tolerated the procedure well.    Disposition: PACU - hemodynamically stable.  Condition: stable         Additional Details: none    Attending Attestation:     Cholo Echeverria  Phone Number: [phone]

## 2024-04-25 NOTE — ANESTHESIA PREPROCEDURE EVALUATION
Patient: Dary Webster    Procedure Information       Date/Time: 04/25/24 0730    Procedure: Hysteroscopy, dilatation and curettage, endometrial ablation with NovaSure    Location: [facility] OR 03 / [facility] OR    Surgeons: Cholo Echeverria MD            Relevant Problems   Neuro   (+) Generalized anxiety disorder      GYN   (+) Menorrhagia with regular cycle       Clinical information reviewed:   Tobacco  Allergies  Meds   Med Hx  Surg Hx  OB Status  Fam Hx  Soc   Hx        NPO/Void Status  Carbohydrate Drink Given Prior to Surgery? : N  Date of Last Liquid: 04/24/24  Time of Last Liquid: 2000  Date of Last Solid: 04/24/24  Time of Last Solid: 2000  Last Intake Type: Clear fluids  Time of Last Void: 0610         Physical Exam    Airway  Mallampati: II  TM distance: >3 FB  Neck ROM: full     Cardiovascular   Rhythm: regular  Rate: normal     Dental    Pulmonary    Abdominal          Anesthesia Plan    History of general anesthesia?: yes  History of complications of general anesthesia?: no    ASA 2     general     intravenous induction   Anesthetic plan and risks discussed with patient.       Anesthesia Evaluation      Airway   Mallampati: II  TM distance: >3 FB  Neck ROM: full  Dental      Pulmonary - negative ROS   Cardiovascular - negative ROS    Rhythm: regular  Rate: normal    Neuro/Psych - negative ROS     GI/Hepatic/Renal - negative ROS     Endo/Other - negative ROS   Abdominal

## 2024-04-25 NOTE — ANESTHESIA PROCEDURE NOTES
Airway  Date/Time: 4/25/2024 7:27 AM  Urgency: elective      Staffing  Performed: GIANFRANCO   Authorized by: Yemi Pacheco MD    Performed by: Yemi Pacheco MD  Patient location during procedure: OR    Indications and Patient Condition  Indications for airway management: anesthesia      Final Airway Details  Final airway type: supraglottic airway      Successful airway: Size 4

## 2024-05-13 ENCOUNTER — TELEPHONE (OUTPATIENT)
Dept: OBSTETRICS AND GYNECOLOGY | Facility: CLINIC | Age: 44
End: 2024-05-13

## 2024-05-13 ENCOUNTER — OFFICE VISIT (OUTPATIENT)
Dept: OBSTETRICS AND GYNECOLOGY | Facility: CLINIC | Age: 44
End: 2024-05-13
Payer: COMMERCIAL

## 2024-05-13 VITALS
WEIGHT: 163 LBS | BODY MASS INDEX: 27.16 KG/M2 | SYSTOLIC BLOOD PRESSURE: 122 MMHG | HEIGHT: 65 IN | DIASTOLIC BLOOD PRESSURE: 68 MMHG

## 2024-05-13 DIAGNOSIS — Z48.89 POSTOPERATIVE VISIT: Primary | ICD-10-CM

## 2024-05-13 PROCEDURE — 1036F TOBACCO NON-USER: CPT | Performed by: OBSTETRICS & GYNECOLOGY

## 2024-05-13 PROCEDURE — 99024 POSTOP FOLLOW-UP VISIT: CPT | Performed by: OBSTETRICS & GYNECOLOGY

## 2024-05-13 NOTE — PROGRESS NOTES
Dary Webster is a 44 y.o. year old female patient.  PCP = Raleigh Allen, APRN-CNP    Chief Complaint   Patient presents with    Post-op Visit     Patient is here for her post op endometrial ablation visit. Patient has no concerns at this time.       HPI   Presents for postop checkup following endometrial ablation.  She voices no complaints and is doing well.    OB History          2    Para   2    Term   2       0    AB   0    Living   2         SAB   0    IAB   0    Ectopic   0    Multiple   0    Live Births   2                 Past Medical History:   Diagnosis Date    Anxiety     Encounter for gynecological examination (general) (routine) without abnormal findings     Pap test, as part of routine gynecological examination    Other conditions influencing health status     Menarche    Other conditions influencing health status     History of vaginal delivery       Past Surgical History:   Procedure Laterality Date    OTHER SURGICAL HISTORY  10/26/2019    Hip surgery    OTHER SURGICAL HISTORY  10/26/2019    Corneal lasik    OTHER SURGICAL HISTORY  10/26/2019    Hernia repair    OTHER SURGICAL HISTORY  2021    Cholecystectomy laparoscopic       Review of Systems:   Constitutional: No fever or chills  Respiratory: No shortness of breath, or cough  Cardiovascular: No chest pain or syncope  Breasts: No breast pain, no masses, no nipple discharge  Gastrointestinal: No nausea, vomiting, or diarrhea, no abdominal pain  Genitourinary: No dysuria or frequency  Gynecology: Negative except as noted in history of present illness  All other: All other systems reviewed and negative for complaint    Medication Documentation Review Audit       Reviewed by Cholo Echeverria MD (Physician) on 24 at 0838      Medication Order Taking? Sig Documenting Provider Last Dose Status   acetaminophen-codeine (Tylenol w/ Codeine #3) 300-30 mg tablet [id number]  Take 1 tablet by mouth every 6 hours if needed for  "moderate pain (4 - 6) or severe pain (7 - 10) for up to 10 doses. Cholo Echeverria MD  Active   ascorbic acid (Vitamin C) 500 mg tablet [id number] No Take 1 tablet (500 mg) by mouth once daily. Historical Provider, MD Unknown Active   busPIRone (Buspar) 5 mg tablet [id number] No Take 1 tablet (5 mg) by mouth 2 times a day.   Patient taking differently: Take 1 tablet (5 mg) by mouth 2 times a day as needed (anxiety).    JOSE Finn-CNP Unknown Active   cyclobenzaprine (Flexeril) 5 mg tablet [id number] No Take 1 tablet (5 mg) by mouth 3 times a day as needed for muscle spasms (hip/low back pain). JOSE Finn-CNP Past Week Active   ferrous gluconate 236 mg (27 mg iron) tablet [id number] No Take by mouth. Historical Provider, MD Past Month Active   hydrOXYzine HCL (Atarax) 10 mg tablet [id number] No Take 1 tablet (10 mg) by mouth every 4 hours if needed for anxiety. Historical Provider, MD Past Week Active   multivitamin tablet [id number] No Take 1 tablet by mouth once daily. Historical Provider, MD Past Week Active                     /68   Ht 1.65 m (5' 4.96\")   Wt 73.9 kg (163 lb)   BMI 27.16 kg/m²     PHYSICAL EXAMINATION:  General: No acute distress  Eye: Intraocular movements are intact  HEENT: Normocephalic  Respiratory: Respirations are nonlabored  Gastrointestinal: Nondistended   Musculoskeletal: Normal range of motion  Neurologic: Alert and oriented x3  Psychiatric: Cooperative, appropriate mood and affect.      Problem List Items Addressed This Visit    None  Visit Diagnoses       Postoperative visit    -  Primary             Provider Impression:  1.  Postop checkup  Patient was shown pictures from surgery.  Patient to return in April for her annual exam.    "

## 2024-05-13 NOTE — TELEPHONE ENCOUNTER
The pharmacy called and only received a verbal order for tylenol with codine on 4/25. Are need to attach a hard copy or e-script. Can you send in an e-script and in the note to pharmacy put DO NOT FILL.

## 2024-05-14 ENCOUNTER — APPOINTMENT (OUTPATIENT)
Dept: RADIOLOGY | Facility: CLINIC | Age: 44
End: 2024-05-14
Payer: COMMERCIAL

## 2024-05-14 ENCOUNTER — HOSPITAL ENCOUNTER (OUTPATIENT)
Dept: RADIOLOGY | Facility: HOSPITAL | Age: 44
Discharge: HOME | End: 2024-05-14
Payer: COMMERCIAL

## 2024-05-14 VITALS — HEIGHT: 65 IN | BODY MASS INDEX: 26.99 KG/M2 | WEIGHT: 162 LBS

## 2024-05-14 DIAGNOSIS — Z12.31 ENCOUNTER FOR SCREENING MAMMOGRAM FOR BREAST CANCER: ICD-10-CM

## 2024-05-14 PROCEDURE — 77067 SCR MAMMO BI INCL CAD: CPT

## 2024-05-14 PROCEDURE — 77067 SCR MAMMO BI INCL CAD: CPT | Performed by: RADIOLOGY

## 2024-05-14 PROCEDURE — 77063 BREAST TOMOSYNTHESIS BI: CPT | Performed by: RADIOLOGY

## 2024-09-10 ENCOUNTER — TELEPHONE (OUTPATIENT)
Dept: PRIMARY CARE | Facility: CLINIC | Age: 44
End: 2024-09-10
Payer: COMMERCIAL

## 2025-03-06 ENCOUNTER — APPOINTMENT (OUTPATIENT)
Dept: PRIMARY CARE | Facility: CLINIC | Age: 45
End: 2025-03-06
Payer: OTHER GOVERNMENT

## 2025-03-06 VITALS
DIASTOLIC BLOOD PRESSURE: 70 MMHG | WEIGHT: 152 LBS | HEART RATE: 60 BPM | SYSTOLIC BLOOD PRESSURE: 116 MMHG | BODY MASS INDEX: 25.29 KG/M2 | OXYGEN SATURATION: 99 %

## 2025-03-06 DIAGNOSIS — F41.1 GENERALIZED ANXIETY DISORDER: ICD-10-CM

## 2025-03-06 DIAGNOSIS — G89.29 CHRONIC LEFT HIP PAIN: ICD-10-CM

## 2025-03-06 DIAGNOSIS — Z00.00 ROUTINE GENERAL MEDICAL EXAMINATION AT A HEALTH CARE FACILITY: Primary | ICD-10-CM

## 2025-03-06 DIAGNOSIS — D50.8 IRON DEFICIENCY ANEMIA SECONDARY TO INADEQUATE DIETARY IRON INTAKE: ICD-10-CM

## 2025-03-06 DIAGNOSIS — M25.552 CHRONIC LEFT HIP PAIN: ICD-10-CM

## 2025-03-06 PROCEDURE — 1036F TOBACCO NON-USER: CPT

## 2025-03-06 PROCEDURE — 99214 OFFICE O/P EST MOD 30 MIN: CPT

## 2025-03-06 RX ORDER — ACETAMINOPHEN AND CODEINE PHOSPHATE 300; 30 MG/1; MG/1
1 TABLET ORAL EVERY 6 HOURS PRN
Qty: 20 TABLET | Refills: 0 | Status: SHIPPED | OUTPATIENT
Start: 2025-03-06 | End: 2025-03-11

## 2025-03-06 RX ORDER — HYDROXYZINE HYDROCHLORIDE 10 MG/1
10 TABLET, FILM COATED ORAL EVERY 4 HOURS PRN
Qty: 30 TABLET | Refills: 2 | Status: SHIPPED | OUTPATIENT
Start: 2025-03-06

## 2025-03-06 RX ORDER — CYCLOBENZAPRINE HCL 5 MG
5 TABLET ORAL 3 TIMES DAILY PRN
Qty: 30 TABLET | Refills: 2 | Status: SHIPPED | OUTPATIENT
Start: 2025-03-06

## 2025-03-06 RX ORDER — LIDOCAINE 50 MG/G
1 PATCH TOPICAL DAILY
Qty: 14 PATCH | Refills: 2 | Status: SHIPPED | OUTPATIENT
Start: 2025-03-06

## 2025-03-06 ASSESSMENT — ENCOUNTER SYMPTOMS
WEAKNESS: 0
DIFFICULTY URINATING: 0
ABDOMINAL PAIN: 0
CHEST TIGHTNESS: 0
HEADACHES: 0
TROUBLE SWALLOWING: 0
FREQUENCY: 0
RECTAL PAIN: 0
FATIGUE: 0
POLYDIPSIA: 0
CHILLS: 0
SHORTNESS OF BREATH: 0
NUMBNESS: 0
CONSTIPATION: 0
UNEXPECTED WEIGHT CHANGE: 0
PALPITATIONS: 0
NERVOUS/ANXIOUS: 0
DIAPHORESIS: 0
WOUND: 0
POLYPHAGIA: 0
MYALGIAS: 1
ARTHRALGIAS: 1
DIARRHEA: 0
NAUSEA: 0

## 2025-03-06 ASSESSMENT — PATIENT HEALTH QUESTIONNAIRE - PHQ9
1. LITTLE INTEREST OR PLEASURE IN DOING THINGS: NOT AT ALL
2. FEELING DOWN, DEPRESSED OR HOPELESS: NOT AT ALL
SUM OF ALL RESPONSES TO PHQ9 QUESTIONS 1 AND 2: 0

## 2025-03-06 NOTE — PROGRESS NOTES
Subjective   Patient ID: Dary Webster is a 44 y.o. female who presents for Med Management (Wants med for flying).    Patient presents today to establish primary care.    Uterine ablation: Performed last year by Dr. Echeverria, no complications no uterine bleeding.    Left hip pain: Chronic, having acute exacerbation requesting prescription for Tylenol with codeine and lidocaine patch.  Both filled today.  Manages muscle spasms with Flexeril.    Iron deficient anemia: No current symptoms, no labs in the last 14 months.  Will recheck today.  Is on iron supplement.    Preventive health: Mammogram last year ([facility]).              Review of Systems   Constitutional:  Negative for chills, diaphoresis, fatigue and unexpected weight change.   HENT:  Negative for dental problem, tinnitus and trouble swallowing.    Eyes:  Negative for visual disturbance.   Respiratory:  Negative for chest tightness and shortness of breath.    Cardiovascular:  Negative for chest pain, palpitations and leg swelling.   Gastrointestinal:  Negative for abdominal pain, constipation, diarrhea, nausea and rectal pain.   Endocrine: Negative for polydipsia, polyphagia and polyuria.   Genitourinary:  Negative for difficulty urinating, frequency and urgency.   Musculoskeletal:  Positive for arthralgias and myalgias.   Skin:  Negative for pallor and wound.   Neurological:  Negative for syncope, weakness, numbness and headaches.   Psychiatric/Behavioral:  Negative for suicidal ideas. The patient is not nervous/anxious.        Objective   /70 (BP Location: Left arm, Patient Position: Sitting)   Pulse 60   Wt 68.9 kg (152 lb)   SpO2 99%   BMI 25.29 kg/m²     Physical Exam  Vitals and nursing note reviewed.   Constitutional:       General: She is not in acute distress.     Appearance: Normal appearance.   HENT:      Head: Normocephalic.      Nose: Nose normal.      Mouth/Throat:      Mouth: Mucous membranes are moist.      Pharynx: Oropharynx is  clear.   Eyes:      General: No scleral icterus.     Pupils: Pupils are equal, round, and reactive to light.   Neck:      Vascular: No carotid bruit.   Cardiovascular:      Rate and Rhythm: Normal rate and regular rhythm.      Pulses: Normal pulses.      Heart sounds: Normal heart sounds. No murmur heard.  Pulmonary:      Effort: Pulmonary effort is normal. No respiratory distress.      Breath sounds: Normal breath sounds. No stridor. No wheezing, rhonchi or rales.   Abdominal:      General: Bowel sounds are normal. There is no distension.      Palpations: Abdomen is soft.      Tenderness: There is no abdominal tenderness. There is no right CVA tenderness or left CVA tenderness.   Musculoskeletal:         General: No swelling. Normal range of motion.      Cervical back: Normal range of motion.      Right lower leg: No edema.      Left lower leg: No edema.   Skin:     General: Skin is warm and dry.      Capillary Refill: Capillary refill takes less than 2 seconds.   Neurological:      General: No focal deficit present.      Mental Status: She is alert and oriented to person, place, and time. Mental status is at baseline.   Psychiatric:         Mood and Affect: Mood normal.         Behavior: Behavior normal.         Thought Content: Thought content normal.         Judgment: Judgment normal.         Assessment/Plan   Diagnoses and all orders for this visit:  Routine general medical examination at a health care facility  -     Lipid Panel; Future  -     Basic Metabolic Panel; Future  Chronic left hip pain  -     cyclobenzaprine (Flexeril) 5 mg tablet; Take 1 tablet (5 mg) by mouth 3 times a day as needed for muscle spasms (hip/low back pain).  -     acetaminophen-codeine (Tylenol w/ Codeine #3) 300-30 mg tablet; Take 1 tablet by mouth every 6 hours if needed for severe pain (7 - 10) for up to 5 days.  -     lidocaine (Lidoderm) 5 % patch; Place 1 patch over 12 hours on the skin once daily. Remove & discard patch within  12 hours or as directed by MD.  Generalized anxiety disorder  -     hydrOXYzine HCL (Atarax) 10 mg tablet; Take 1 tablet (10 mg) by mouth every 4 hours if needed for anxiety.  Iron deficiency anemia secondary to inadequate dietary iron intake  -     CBC; Future  -     Iron and TIBC; Future  -     Ferritin; Future

## 2025-04-01 ENCOUNTER — APPOINTMENT (OUTPATIENT)
Dept: OBSTETRICS AND GYNECOLOGY | Facility: CLINIC | Age: 45
End: 2025-04-01
Payer: COMMERCIAL

## 2025-06-12 ENCOUNTER — PATIENT MESSAGE (OUTPATIENT)
Dept: PRIMARY CARE | Facility: CLINIC | Age: 45
End: 2025-06-12
Payer: OTHER GOVERNMENT

## 2025-06-12 DIAGNOSIS — F41.1 GENERALIZED ANXIETY DISORDER: ICD-10-CM

## 2025-06-12 RX ORDER — BUSPIRONE HYDROCHLORIDE 5 MG/1
5 TABLET ORAL 2 TIMES DAILY
Qty: 60 TABLET | Refills: 0 | Status: SHIPPED | OUTPATIENT
Start: 2025-06-12 | End: 2025-07-12

## 2026-03-03 ENCOUNTER — APPOINTMENT (OUTPATIENT)
Dept: PRIMARY CARE | Facility: CLINIC | Age: 46
End: 2026-03-03
Payer: OTHER GOVERNMENT

## (undated) DEVICE — MASK, FACE, ANESTHESIA, ADULT, LARGE, P6, RED

## (undated) DEVICE — PREP TRAY, VAGINAL

## (undated) DEVICE — DRAPE, SURGICAL, UNDERBUTTOCKS, DISP

## (undated) DEVICE — SET, TUBE INFLOW AQUILEX

## (undated) DEVICE — GLOVE, PROTEXIS PI CLASSIC, SZ-7.5, PF, LF

## (undated) DEVICE — TOWEL, OR, XRAY DECTECTABLE, 17 X 27, BLUE, STERILE

## (undated) DEVICE — CIRCUIT, BREATHING, ADULT, B/V FILTER, HMEF, 3 L BAG, 108 IN

## (undated) DEVICE — Device

## (undated) DEVICE — DEVICE, NOVA SURE V5, ENDOMETRIAL ABLATION

## (undated) DEVICE — SOLUTION, IRRIGATION, STERILE WATER, 1000 ML, POUR BOTTLE

## (undated) DEVICE — CANISTER, SUCTION, 3000CC, W/COVER NON ATTACHED, 18IN TUBES

## (undated) DEVICE — STRAP, KNEE AND BODY, SINGLE USE

## (undated) DEVICE — STRAP, ARMBOARD, 3IN, BLUE/WHITE, SECURE HOOK

## (undated) DEVICE — LINE, GAS SAMPLING, .05IN 1D 10FT, M/M CONNECTORS

## (undated) DEVICE — SOLUTION, IRRIGATION, USP, SODIUM CHLORIDE 0.9%, 3000 ML, BAG